# Patient Record
Sex: FEMALE | Race: OTHER
[De-identification: names, ages, dates, MRNs, and addresses within clinical notes are randomized per-mention and may not be internally consistent; named-entity substitution may affect disease eponyms.]

---

## 2019-07-31 ENCOUNTER — HOSPITAL ENCOUNTER (EMERGENCY)
Dept: HOSPITAL 74 - JER | Age: 64
Discharge: HOME | End: 2019-07-31
Payer: COMMERCIAL

## 2019-07-31 VITALS — TEMPERATURE: 97.7 F

## 2019-07-31 VITALS — HEART RATE: 71 BPM | SYSTOLIC BLOOD PRESSURE: 130 MMHG | DIASTOLIC BLOOD PRESSURE: 71 MMHG

## 2019-07-31 VITALS — BODY MASS INDEX: 37.2 KG/M2
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
## 2019-07-31 DIAGNOSIS — R07.9: ICD-10-CM

## 2019-07-31 DIAGNOSIS — I10: ICD-10-CM
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
## 2019-07-31 DIAGNOSIS — E78.5: ICD-10-CM

## 2019-07-31 DIAGNOSIS — M54.30: ICD-10-CM

## 2019-07-31 DIAGNOSIS — R42: Primary | ICD-10-CM

## 2019-07-31 DIAGNOSIS — M54.9: ICD-10-CM

## 2019-07-31 DIAGNOSIS — S46.819A: ICD-10-CM

## 2019-07-31 DIAGNOSIS — R73.03: ICD-10-CM

## 2019-07-31 LAB
ALBUMIN SERPL-MCNC: 3.8 G/DL (ref 3.4–5)
ALP SERPL-CCNC: 75 U/L (ref 45–117)
ALT SERPL-CCNC: 37 U/L (ref 13–61)
ANION GAP SERPL CALC-SCNC: 6 MMOL/L (ref 8–16)
AST SERPL-CCNC: 35 U/L (ref 15–37)
BASOPHILS # BLD: 0.6 % (ref 0–2)
BILIRUB SERPL-MCNC: 0.4 MG/DL (ref 0.2–1)
BUN SERPL-MCNC: 12.3 MG/DL (ref 7–18)
CALCIUM SERPL-MCNC: 9.3 MG/DL (ref 8.5–10.1)
CHLORIDE SERPL-SCNC: 105 MMOL/L (ref 98–107)
CO2 SERPL-SCNC: 28 MMOL/L (ref 21–32)
CREAT SERPL-MCNC: 0.5 MG/DL (ref 0.55–1.3)
DEPRECATED RDW RBC AUTO: 14.3 % (ref 11.6–15.6)
EOSINOPHIL # BLD: 1.6 % (ref 0–4.5)
GLUCOSE SERPL-MCNC: 101 MG/DL (ref 74–106)
HCT VFR BLD CALC: 39.9 % (ref 32.4–45.2)
HGB BLD-MCNC: 13.7 GM/DL (ref 10.7–15.3)
LYMPHOCYTES # BLD: 29.8 % (ref 8–40)
MCH RBC QN AUTO: 31.7 PG (ref 25.7–33.7)
MCHC RBC AUTO-ENTMCNC: 34.3 G/DL (ref 32–36)
MCV RBC: 92.6 FL (ref 80–96)
MONOCYTES # BLD AUTO: 6.6 % (ref 3.8–10.2)
NEUTROPHILS # BLD: 61.4 % (ref 42.8–82.8)
PLATELET # BLD AUTO: 213 K/MM3 (ref 134–434)
PMV BLD: 8.2 FL (ref 7.5–11.1)
POTASSIUM SERPLBLD-SCNC: 4.6 MMOL/L (ref 3.5–5.1)
PROT SERPL-MCNC: 7.4 G/DL (ref 6.4–8.2)
RBC # BLD AUTO: 4.31 M/MM3 (ref 3.6–5.2)
SODIUM SERPL-SCNC: 139 MMOL/L (ref 136–145)
WBC # BLD AUTO: 5.8 K/MM3 (ref 4–10)

## 2019-07-31 PROCEDURE — 3E0337Z INTRODUCTION OF ELECTROLYTIC AND WATER BALANCE SUBSTANCE INTO PERIPHERAL VEIN, PERCUTANEOUS APPROACH: ICD-10-PCS

## 2019-07-31 PROCEDURE — 3E033NZ INTRODUCTION OF ANALGESICS, HYPNOTICS, SEDATIVES INTO PERIPHERAL VEIN, PERCUTANEOUS APPROACH: ICD-10-PCS

## 2019-07-31 NOTE — PDOC
Documentation entered by Floyd Chavez SCRIBE, acting as scribe for Livia Fregoso MD.








Livia Fregoso MD:  This documentation has been prepared by the Scott maxwell Joel, SCRIBE, under my direction and personally reviewed by me in its 

entirety.  I confirm that the documentation accurately reflects all work, 

treatment, procedures, and medical decision making performed by me.  





History of Present Illness





- General


Chief Complaint: Lightheaded


Stated Complaint: DIZZINESS,WEAKNESS


Time Seen by Provider: 19 09:10


History Source: Patient


Exam Limitations: No Limitations





- History of Present Illness


Initial Comments: 





19 10:10


The patient is a 64 year old female with a significant PMH of vertigo, HTN, pre-

diabetes, hyperlipidemia, fibromyalgia, sciatica and arthritis who presents to 

the emergency department for evaluation of acute onset of vertigo, dizziness 

and nausea beginning approximately 5 hours ago. The patient reports waking up  

at about 4am and trying to get off the bed when she experienced room-spinning 

sensation with associated epigastric pain and nausea. She reports taking 

Meclizine to some relief that she had leftover from a 2019 visit for similar 

episode of vertigo. The patient endorses left sided neck pain with radiation to 

the occipital region, dry throat, left ear pain, cough, bilateral eye pain with 

associated blurry vision, and increased urinary frequency since this morning (

but has been drinking water). The patient notes she was told she had vertigo in 

2019, which she was prescribed Meclizine for the symptoms which she takes as 

needed. The patient also notes an episode of palpitations about 3 days ago that 

lasted a few minutes, and also experienced an episode of anterior chest 

pressure shortly after presentation to the ED. 





Denies fever, chills, SOB, weakness, V, D,  bladder and bowel problems, leg 

swelling, No sick contacts or travel. No new changes in medications. No 

suspicious food intake. no environmental triggers.


no stressors.


denies falls or trauma/injuries





Allergies: None


Past Medical History: as documented in EMR/HPI


Social history: Lives with family. No tobacco, ETOH or drug use.


Surgical history: Parathyroidectomy.  


Meds: as documented in EMR


PMD: Dr. Enrique





19 10:49





19 10:52





19 10:52








Past History





- Past Medical History


Allergies/Adverse Reactions: 


 Allergies











Allergy/AdvReac Type Severity Reaction Status Date / Time


 


No Known Allergies Allergy   Verified 19 09:33











Home Medications: 


Ambulatory Orders





Cyclobenzaprine HCl [Flexeril 10 mg] 10 mg PO TID PRN #15 tablet 19 








COPD: No


HTN: Yes


Hypercholesterolemia: Yes


Other medical history: vertigo, fibronyalsia





- Immunization History


Immunization Up to Date: Yes





- Suicide/Smoking/Psychosocial Hx


Smoking History: Never smoked


Hx Alcohol Use: No


Drug/Substance Use Hx: No





**Review of Systems





- Review of Systems


Able to Perform ROS?: Yes


Comments:: 





19 10:15


Constitutional: no fevers or chills.


HEENT: (+) Blurry vision. (+) Bilateral eye pain. +ear pain. no headache. No 

congestion. No hearing disturbances.


CVS:  (+) Anterior chest pressure. + palpitations. no syncope.


Resp: (+) Cough. no sob. 


Gastrointestinal: (+) Epigastric pain. (+) Nausea.  no vomiting. no diarrhea.


Genitourinary: (+) Increased urinary frequency. no hematuria.


MUSCULOSKELETAL: (+) Neck pain with radiation to occipital region. (+) Chronic 

lower back pain from sciatica. +right knee pain. No joint pain and swelling.


SKIN: no redness or skin changes, no discharge, no rash. No wounds.


Hematologic: no easy bruising/bleeding. 


NEUROLOGIC: (+) Dizziness. No headache, LOC or altered mental status. No 

weakness, numbness or tingling. 


Psych: no anxiety or depression


Allergic/Immunologic: no allergies


All other systems reviewed and negative, or as documented in HPI. 








19 10:51








*Physical Exam





- Vital Signs


 Last Vital Signs











Temp Pulse Resp BP Pulse Ox


 


 97.7 F   62   18   148/85   99 


 


 19 09:01  19 09:01  19 09:01  19 09:01  19 09:01














- Physical Exam


Comments: 





19 10:17


General:   Well appearing, awake and alert, NAD. 


HEENT:  NCAT, PERRL, EOMI, No nystagmus. clear conjunctiva, anicteric, moist 

mucus membranes, clear oropharynx, no oral lesions.. 


Neck:  neck supple, FROM


Resp:  CTAB, normal and even respirations, no respiratory distress


CVS: RRR, no murmurs, 2+ peripheral pulses throughout, no peripheral edema


Abdomen: soft, NTND, no peritoneal signs. 


Back: nontender, normal inspection and ROM


MSK:  (+) Upper thoracic muscular tenderness to palpation. (+) Diffuse lower 

back pain tenderness. (+) Left trapezius tenderness. no edema, SCRUGGS x4, ROM 

intact. No clubbing or cyanosis. normal bulk and tone.


Neuro: alert, no focal neuro deficits, oriented to person time and place. No 

carotid bruit, CN II-XII grossly intact. Strength prox and distally 5/5 

throughout. Sensation grossly intact to light touch. SCRUGGS x4. No cerebellar signs

, no dysmetria, bilateral finger to nose and heel to shin equal and symmetric. 

Speech clear. gait stable.  


Psych: calm and cooperative


Skin: warm and well perfused, cap refill <2 sec, normal color


19 10:51








**Heart Score/ECG Review


  ** #1


ECG reviewed & interpreted by me at: 09:40


General ECG Interpretation: Sinus Rhythm, Normal Rate, Normal Intervals


Compared to previous ECG there are: Previous ECG unavail





19 10:07





EKG normal sinus rhythm at 60 bpm, no interval abnormalities, narrow QRS, ST 

and T wave segments and morphology normal. Nonspecific T wave abnormalities 

with TWI in inferior leads III, AVF








ED Treatment Course





- LABORATORY


CBC & Chemistry Diagram: 


 19 09:30





 19 09:30





- RADIOLOGY


Radiology Studies Ordered: 














 Category Date Time Status


 


 CHEST PA & LAT [RAD] Stat Radiology  19 09:33 Ordered














- Medications


Given in the ED: 


ED Medications














Discontinued Medications














Generic Name Dose Route Start Last Admin





  Trade Name Freq  PRN Reason Stop Dose Admin


 


Acetaminophen  975 mg  19 09:36  19 09:54





  Tylenol -  PO  19 09:37  975 mg





  ONCE ONE   Administration





     





     





     





     


 


Lorazepam  1 mg  19 09:35  19 09:49





  Ativan Injection -  IVPUSH  19 09:36  1 mg





  ONCE ONE   Administration





     





     





     





     














Medical Decision Making





- Medical Decision Making





19 10:06





See HPI for details. Prior notes reviewed, including admissions, discharges and 

consultations. 


Vital signs reviewed, wnl. 


Vital Signs











Temp Pulse Resp BP Pulse Ox


 


 97.7 F   62   18   148/85   99 


 


 19 09:01  19 09:01  19 09:01  19 09:01  19 09:01








DDX vertigo, BPPV, labrynthitis, ACS, arrhythmia, angina, anemia, electrolyte/

metabolic derangements. back strain/spasm. msk strain.


no infectious etiology.





laboratory results and imaging reviewed, basic labs and lytes wnl, notable for 

normal LFts


CXR_no acute chest pathology


Cardiac panel_neg x1, reassuring. will need serial trop with chest pressure 

here.


EKG normal sinus rhythm at 60 bpm, no interval abnormalities, narrow QRS, ST 

and T wave segments and morphology normal. Nonspecific T wave abnormalities 

with TWI in inferior leads III, AVF


ED course


-interventions: analgesia, IV ativan for vertigo/muscle spasms


- back pain very reproducible, over left lateral trapezius as well. sciatica 

was induced with msk movements


- neuro intact, no focal deficits, no ataxia, ambulatory, no focal findings.


no imaging indicated at this time, as doubt central etiology of vertigo


- 2 trops/EKG - unchanged, repeat EKG unchanged, nonspecific T wave 

abnormalities, no elevations of ST segments


- on reassessment, symptoms improved, ambulatory. no cp or sob, comfortable 

with discharge and f/u cards/neuro for vertigo/chest pain


- flexeril prn for muscle relaxants.





Pt to be discharged in stable condition. Patient and family made aware of 

clinical impression, treatment recommendations and disposition plan, return 

precautions discussed (including but not limited to new or persistent/worsening 

symptoms, pain, fevers, or signs of infection, chest pain, respiratory distress

, inability to tolerate oral intake, dehydration, syncope, or neurologic changes

). Follow up with PMD and/or specialists as recommended, follow up information 

provided, take medications as instructed for duration of time. continue with 

supportive care, avoid triggers and precipitants. All questions answered to 

patient's satisfaction and expressed understanding and comfort with this. At 

the time of discharge, the patient is alert, clinically improved, tolerating po 

and verbalizes understanding of instructions, satisfied with the care received 

and felt comfortable with the plan. Patient does not suffer from an acute life-

threatening medical condition at this time and  is safe for outpatient follow-

up. 





19 14:43








*DC/Admit/Observation/Transfer


Diagnosis at time of Disposition: 


 Vertigo





Chest pain


Qualifiers:


 Chest pain type: unspecified Qualified Code(s): R07.9 - Chest pain, unspecified





Back pain


Qualifiers:


 Back pain location: back pain in unspecified location Chronicity: unspecified 

Back pain laterality: unspecified Qualified Code(s): M54.9 - Dorsalgia, 

unspecified





Trapezius muscle strain


Qualifiers:


 Encounter type: initial encounter Laterality: unspecified laterality Qualified 

Code(s): S46.819A - Strain of other muscles, fascia and tendons at shoulder and 

upper arm level, unspecified arm, initial encounter








- Discharge Dispostion


Disposition: HOME


Condition at time of disposition: Good


Decision to Admit order: No





- Prescriptions


Prescriptions: 


Cyclobenzaprine HCl [Flexeril 10 mg] 10 mg PO TID PRN #15 tablet


 PRN Reason: Muscle Spasms





- Referrals


Referrals: 


Sunil Enrique MD [Non Staff, Medical] - 


Mikey Butler MD [Staff Physician] - 


Andrey Riddle MD [Staff Physician] - 


Daniele Jones DO [Staff Physician] - 


Orlando Oneal MD [Staff Physician] - 


Michi Sauceda MD [Staff Physician] - 





- Patient Instructions


Printed Discharge Instructions:  DI for Vertigo, DI for Chest Pain, DI for 

Thoracic Back Pain, DI for Low Back Pain, DI for Sciatica


Additional Instructions: 


1) Please follow-up with your primary care doctor in the next 1-2 days. Please 

call tomorrow for for any urgent issues. 


Neurologist referrals given for management of your vertigo/dizziness


cardiologists also given for your chest pain


2) You were given a copy of the tests performed today. Please bring the results 

with you and review them with your primary care doctor. Your laboratory / 

imaging results were normal, including your cardiac enzymes


3) If you have any worsening of symptoms or any other concerns please return to 

the ED immediately. Return if worsening symptoms including fevers, headache, 

vomiting, visual or hearing disturbances, abdominal pain, chest pain, shortness 

of breath, syncope, dehydration, inability to take things by mouth/vomiting, 

altered mental status, or worsening concerning symptoms. 


4) Please continue taking your home medications as directed. your medications 

on discharge include Meclizine (which you have at home) take this three times a 

day as needed for dizziness . side effects may include upset stomach, abdominal 

pain, vomiting, or diarrhea. do not drink alcohol with your medications. this 

can cause you to feel sleepy as well, so avoid driving or operating machinery


Stay well hydrated and rest adequately. Make  an appointment. If you cannot 

follow-up with your primary care doctor please return to the ED 


------------------------------------------------------


1) Mann un seguimiento con [facility] atencin primaria en los prximos 1-2 

[place]. Llame maana para cualquier problema urgente.


Referencias de neurlogos para el manejo de vrtigo / mareos.


cardilogos tambin administrados para el dolor en el pecho


2) Le dieron lyla copia de las pruebas realizadas hoy. Traiga los resultados con 

usted y revselos con [facility]encin prim[facility]. Los resultados de [facility] 

laboratorio / imagen fueron normales, incluidas bernard enzimas cardacas.


3) Si tiene algn empeoramiento de los sntomas o cualquier otra inquietud, 

regrese al servicio de urgencias de inmediato. Regrese si empeora los sntomas, 

reed fiebre, dolor de domingo, vmitos, trastornos visuales o auditivos, dolor 

abdominal, dolor en el pecho, falta de aliento, sncope, deshidratacin, 

incapacidad para cindy cosas por la boca / vmitos, estado mental alterado o 

empeoramiento de los sntomas.


4) Contine tomando bernard medicamentos caseros segn las indicaciones. bernard 

medicamentos al michaela incluyen Meclizine (que tiene en casa) tome esto bobbi 

veces al da segn sea necesario para mareos. Los efectos secundarios pueden 

incluir malestar estomacal, dolor abdominal, vmitos o diarrea. No tome alcohol 

con bernard medicamentos. esto tambin puede causarle sueo, as que evite conducir 

u operar maquinaria


Mantngase cas hidratado y descanse adecuadamente. Mann lyla fareed Si no puede 

hacer un seguimiento con [facility] atencin prim[facility], regrese al servicio de 

urgencias





Print Language: Italian





- Post Discharge Activity

## 2019-07-31 NOTE — EKG
Test Reason : 

Blood Pressure : ***/*** mmHG

Vent. Rate : 060 BPM     Atrial Rate : 060 BPM

   P-R Int : 162 ms          QRS Dur : 088 ms

    QT Int : 426 ms       P-R-T Axes : 021 086 -11 degrees

   QTc Int : 426 ms

 

NORMAL SINUS RHYTHM

POSSIBLE LEFT ATRIAL ENLARGEMENT

ABNORMAL QRS-T ANGLE, CONSIDER PRIMARY T WAVE ABNORMALITY

ABNORMAL ECG

NO PREVIOUS ECGS AVAILABLE

Confirmed by LAI SAAB MD ([id number]) on 7/31/2019 2:25:15 PM

 

Referred By:             Confirmed By:LAI SAAB MD

## 2019-07-31 NOTE — EKG
Test Reason : 

Blood Pressure : ***/*** mmHG

Vent. Rate : 068 BPM     Atrial Rate : 068 BPM

   P-R Int : 162 ms          QRS Dur : 090 ms

    QT Int : 406 ms       P-R-T Axes : 044 -38 093 degrees

   QTc Int : 431 ms

 

NORMAL SINUS RHYTHM

LEFT AXIS DEVIATION

NONSPECIFIC T WAVE ABNORMALITY

ABNORMAL ECG

WHEN COMPARED WITH ECG OF 31-JUL-2019 09:39,

QRS AXIS SHIFTED LEFT

NON-SPECIFIC CHANGE IN ST SEGMENT IN INFERIOR LEADS

T WAVE INVERSION NO LONGER EVIDENT IN INFERIOR LEADS

NONSPECIFIC T WAVE ABNORMALITY, WORSE IN LATERAL LEADS

Confirmed by KAISER LACKEY, LAI ([id number]) on 7/31/2019 2:30:48 PM

 

Referred By:             Confirmed By:LAI SAAB MD

## 2019-08-01 NOTE — EKG
Test Reason : 

Blood Pressure : ***/*** mmHG

Vent. Rate : 067 BPM     Atrial Rate : 067 BPM

   P-R Int : 164 ms          QRS Dur : 090 ms

    QT Int : 372 ms       P-R-T Axes : 040 -36 108 degrees

   QTc Int : 393 ms

 

NORMAL SINUS RHYTHM

LEFT AXIS DEVIATION

SEPTAL INFARCT , AGE UNDETERMINED

ABNORMAL ECG

WHEN COMPARED WITH ECG OF 31-JUL-2019 09:39,

QRS AXIS SHIFTED LEFT

ST NOW DEPRESSED IN INFERIOR LEADS

NONSPECIFIC T WAVE ABNORMALITY, WORSE IN LATERAL LEADS

Confirmed by DARRIAN SILVERMAN MD ([id number]) on 8/1/2019 2:06:01 PM

 

Referred By:             Confirmed By:DARRIAN SILVERMAN MD

## 2019-09-24 ENCOUNTER — HOSPITAL ENCOUNTER (INPATIENT)
Dept: HOSPITAL 74 - JER | Age: 64
LOS: 1 days | Discharge: HOME | DRG: 203 | End: 2019-09-25
Attending: NURSE PRACTITIONER | Admitting: GENERAL ACUTE CARE HOSPITAL
Payer: COMMERCIAL

## 2019-09-24 VITALS — BODY MASS INDEX: 37.5 KG/M2

## 2019-09-24 DIAGNOSIS — K21.9: ICD-10-CM

## 2019-09-24 DIAGNOSIS — R42: ICD-10-CM

## 2019-09-24 DIAGNOSIS — R07.89: Primary | ICD-10-CM

## 2019-09-24 DIAGNOSIS — F32.9: ICD-10-CM

## 2019-09-24 DIAGNOSIS — E78.5: ICD-10-CM

## 2019-09-24 DIAGNOSIS — N20.0: ICD-10-CM

## 2019-09-24 DIAGNOSIS — R73.03: ICD-10-CM

## 2019-09-24 DIAGNOSIS — M79.7: ICD-10-CM

## 2019-09-24 DIAGNOSIS — R10.9: ICD-10-CM

## 2019-09-24 DIAGNOSIS — I10: ICD-10-CM

## 2019-09-24 LAB
ALBUMIN SERPL-MCNC: 4.1 G/DL (ref 3.4–5)
ALP SERPL-CCNC: 81 U/L (ref 45–117)
ALT SERPL-CCNC: 43 U/L (ref 13–61)
ANION GAP SERPL CALC-SCNC: 6 MMOL/L (ref 8–16)
AST SERPL-CCNC: 38 U/L (ref 15–37)
BASOPHILS # BLD: 0.9 % (ref 0–2)
BILIRUB SERPL-MCNC: 0.3 MG/DL (ref 0.2–1)
BUN SERPL-MCNC: 8.3 MG/DL (ref 7–18)
CALCIUM SERPL-MCNC: 9.7 MG/DL (ref 8.5–10.1)
CHLORIDE SERPL-SCNC: 103 MMOL/L (ref 98–107)
CO2 SERPL-SCNC: 31 MMOL/L (ref 21–32)
CREAT SERPL-MCNC: 0.5 MG/DL (ref 0.55–1.3)
DEPRECATED RDW RBC AUTO: 13.8 % (ref 11.6–15.6)
EOSINOPHIL # BLD: 2.7 % (ref 0–4.5)
GLUCOSE SERPL-MCNC: 85 MG/DL (ref 74–106)
HCT VFR BLD CALC: 39.6 % (ref 32.4–45.2)
HGB BLD-MCNC: 13.5 GM/DL (ref 10.7–15.3)
LIPASE SERPL-CCNC: 372 U/L (ref 73–393)
LYMPHOCYTES # BLD: 40.5 % (ref 8–40)
MCH RBC QN AUTO: 31.3 PG (ref 25.7–33.7)
MCHC RBC AUTO-ENTMCNC: 34.1 G/DL (ref 32–36)
MCV RBC: 91.9 FL (ref 80–96)
MONOCYTES # BLD AUTO: 8.3 % (ref 3.8–10.2)
NEUTROPHILS # BLD: 47.6 % (ref 42.8–82.8)
PH UR: 6.5 [PH] (ref 5–8)
PLATELET # BLD AUTO: 225 K/MM3 (ref 134–434)
PMV BLD: 8 FL (ref 7.5–11.1)
POTASSIUM SERPLBLD-SCNC: 3.8 MMOL/L (ref 3.5–5.1)
PROT SERPL-MCNC: 7.5 G/DL (ref 6.4–8.2)
RBC # BLD AUTO: 4.31 M/MM3 (ref 3.6–5.2)
SODIUM SERPL-SCNC: 139 MMOL/L (ref 136–145)
SP GR UR: 1.01 (ref 1.01–1.03)
UROBILINOGEN UR STRIP-MCNC: 0.2 MG/DL (ref 0.2–1)
WBC # BLD AUTO: 6.4 K/MM3 (ref 4–10)

## 2019-09-24 PROCEDURE — A9502 TC99M TETROFOSMIN: HCPCS

## 2019-09-24 RX ADMIN — POLYVINYL ALCOHOL SCH: 14 SOLUTION/ DROPS OPHTHALMIC at 19:00

## 2019-09-24 RX ADMIN — POLYVINYL ALCOHOL SCH DROP: 14 SOLUTION/ DROPS OPHTHALMIC at 22:36

## 2019-09-24 NOTE — PDOC
Attending Attestation





- Resident


Resident Name: Yogesh Rodas





- ED Attending Attestation


I have performed the following: I have examined & evaluated the patient, The 

case was reviewed & discussed with the resident, I agree w/resident's findings 

& plan





- HPI


HPI: 





09/24/19 15:53


64 year old female with PMH significant for vertigo, HTN, HLD, fibromyalgia, 

and pre-DM. She was getting an echo done at her cardiologist's office (Dr. Bui) when she started developing chest pain, and was referred to the 

ER. could  not finish her stress test today with her chest pain.


intermittent left sided chest and arm pain x 4 months. can last 2-4 hours, 

gradual, dull and reproducible





a/w sharp shooting pains in her left hand x 9 months.





Her PCP told her that this was a symptom of her Fibromyalgia, and she received 

and injection in her left elbow 4 months ago, which she claims did not help. 

Around 5 months ago, the pain began to radiate past her left elbow, all the way 

to her chest. The pain is associated with some mild SOB and a headache.





She also complains of intermittent RUQ and RLQ abdominal pain with lower back 

pain for the past 3 months. She has associated brown discoloration of the urine

, but no associated nausea, vomiting, diarrhea, dysuria, or hematuria.





She has been taking Paroxetine since 1997, which she discontinued 2 months ago. 

She has had no other changes to her medication, and denies any recent illnesses

, exposure to sick contacts, or recent travel. 








09/24/19 15:53





09/24/19 16:00








- Physicial Exam


PE: 





09/24/19 15:53


Agree with the resident's HPI and PE as documented in the electronic medical 

record.


NAD, well appearing, EOMI, PERRL, MMM, nl conjunctiva, anicteric; neck supple. 

lungs clear, RRR, abdomen soft nontender. Back nontender. SCRUGGS x4, no focal 

neuro deficits. No peripheral edema. normal color for ethnicity, WWP.








- Medical Decision Making





09/24/19 15:54


See HPI for details. Prior notes reviewed, including admissions, discharges and 

consultations. 


Vital signs reviewed, wnl. 


Vital Signs











Temp Pulse Resp BP Pulse Ox


 


 98.2 F   74   17   139/81   96 


 


 09/24/19 11:49  09/24/19 11:49  09/24/19 11:49  09/24/19 11:49  09/24/19 11:49








DDx chest pain: ACS, coronary vasospasm, NSTEMI, arrhythmia, unstable angina, PE

, dissection, PUD, esophageal spasm, GERD, gastritis, costochondritis, pneumonia

, pleurisy, pericarditis/myocarditis. dehydration, electrolyte/metabolic 

derangements. 





laboratory results and imaging reviewed, basic labs and lytes wnl, 


LFTs/lipase_wnl


UA_unremarkable.


CXR_no acute chest pathology. clear.


Cardiac panel_neg


EKG normal sinus rhythm at 69 bpm, no interval abnormalities, narrow QRS, ST 

and T wave segments and morphology normal. Nonspecific T wave abnormalities 





ED course: no events.


GB sono neg for stones or anton. 


admit to complete stress test, cards cs Dr Bui, medical management, 

serial trops.





09/24/19 15:58








**Heart Score/ECG Review


  ** #1


ECG reviewed & interpreted by me at: 11:40


General ECG Interpretation: Sinus Rhythm, Normal Rate, Normal Intervals


Compared to previous ECG there are: No significant change





09/24/19 15:59


EKG normal sinus rhythm at 69 bpm, no interval abnormalities, narrow QRS, ST 

and T wave segments and morphology normal. Nonspecific T wave abnormalities

## 2019-09-24 NOTE — HP
Admitting History and Physical





- Primary Care Physician


PCP: Koby Enrique





- Admission


Chief Complaint: chest pressure associated with epigastric pain radiating to 

right flank


History of Present Illness: 





64 year old female with PMH significant for vertigo, HTN, HLD, fibromyalgia, 

and pre-DM. She was getting an echo done at her cardiologist's office (Dr. Bui) when she started developing chest pain, and was referred to the 

ER. 





She states that she has had intermittent chest pain for the past 4 months, 

mostly on the left side of her chest, with episodes lasting 2-4 hours, gradual 

in onset, and dull in nature, reproducible on palpation and non-pleuritic. The 

chest pain is associated with left hand shooting pain that she has had for the 

past 9 months. The pain initially began in the 2nd and 3rd fingers of the left 

hand, radiating up to the elbow, exacerbated by movement of the arm. Her PCP 

told her that this was a symptom of her Fibromyalgia, and she received and 

injection in her left elbow 4 months ago, which she claims did not help. Around 

5 months ago, the pain began to radiate past her left elbow, all the way to her 

chest. The pain is associated with some mild SOB and a headache.





She also complains of intermittent RUQ and RLQ abdominal pain with lower back 

pain for the past 3 months. She has associated brown discoloration of the urine

, but no associated nausea, vomiting, diarrhea, dysuria, or hematuria.





She has been taking Paroxetine since 1997, which she discontinued 2 months ago. 

She has had no other changes to her medication, and denies any recent illnesses

, exposure to sick contacts, or recent travel. 


History Source: Patient


Limitations to Obtaining History: No Limitations





- Past Medical History


Cardiovascular: Yes: HTN, Hyperlipdemia


Pulmonary: No: Asthma, Bronchitis, Cancer, COPD, O2 Dependent, Pneumonia, 

Previously Intubated, Pulmonary Embolus, Pulmonary Fibrosis, Sleep Apnea, Other


Gastrointestinal: Yes: GERD


Hepatobiliary: No: Cirrhosis, Cholelithiasis, Cholecystitis, Choledocholithiasis

, Hepatitis A, Hepatitis B, Hepatitis C, Other


Renal/: No: Renal Failure, Renal Inusuff, BPH, Cancer, Hematuria, Hemodialysis

, Neurogenic Bladder, Renal Calculi, UTI, Other


Psych: Yes: Depression





- Smoking History


Smoking history: Unknown if ever smoked





- Alcohol/Substance Use


Hx Alcohol Use: No





- Social History


Usual Living Arrangement: Yes: With Child (lives with children)


History of Recent Travel: No





Home Medications





- Allergies


Allergies/Adverse Reactions: 


 Allergies











Allergy/AdvReac Type Severity Reaction Status Date / Time


 


No Known Allergies Allergy   Verified 09/24/19 11:43














- Home Medications


Home Medications: 


Ambulatory Orders





Amlodipine Besylate [Norvasc -] 5 mg PO DAILY 09/24/19 


Duloxetine HCl [Cymbalta] 30 mg PO DAILY 09/24/19 


Nortriptyline HCl [Pamelor -] 10 mg PO HS 09/24/19 


Paroxetine HCl [Paxil] 20 mg PO DAILY 09/24/19 


Polyvinyl Alcohol [Artificial Tears] 1 drop OU ASDIR 09/24/19 


Ranitidine [Zantac -] 300 mg PO HS 09/24/19 


Simvastatin 40 mg PO HS 09/24/19 











Family Medical History


Family History: Denies





Review of Systems





- Review of Systems


Constitutional: reports: No Symptoms


Eyes: reports: No Symptoms


HENT: reports: No Symptoms


Neck: reports: No Symptoms


Cardiovascular: reports: Chest Pain, Shortness of Breath (on exertion)


Respiratory: reports: Exercise Intolerance, SOB on Exertion


Gastrointestinal: reports: Abdominal Pain


Genitourinary: reports: No Symptoms


Musculoskeletal: reports: No Symptoms


Integumentary: reports: No Symptoms


Neurological: reports: No Symptoms


Endocrine: reports: No Symptoms


Hematology/Lymphatic: reports: No Symptoms


Psychiatric: reports: No Symptoms





Physical Examination


Vital Signs: 


 Vital Signs











Temperature  98.2 F   09/24/19 11:49


 


Pulse Rate  74   09/24/19 11:49


 


Respiratory Rate  17   09/24/19 11:49


 


Blood Pressure  139/81   09/24/19 11:49


 


O2 Sat by Pulse Oximetry (%)  96   09/24/19 11:49











Constitutional: Yes: Well Nourished, No Distress, Calm


Eyes: Yes: WNL, Conjunctiva Clear, EOM Intact


HENT: Yes: WNL, Atraumatic, Normocephalic


Neck: Yes: WNL, Supple, Trachea Midline


Cardiovascular: Yes: WNL, Regular Rate and Rhythm


Respiratory: Yes: WNL, Regular, CTA Bilaterally


Gastrointestinal: Yes: Normal Bowel Sounds, Soft, Tenderness (epigastric area)


...Rectal Exam: Yes: Deferred


Breast(s): Yes: WNL


Musculoskeletal: Yes: WNL


Extremities: Yes: WNL


Edema: Yes


Peripheral Pulses WNL: Yes


Integumentary: Yes: WNL


Neurological: Yes: WNL, Alert, Oriented


...Motor Strength: WNL


Psychiatric: Yes: WNL


Labs: 


 CBC, BMP





 09/24/19 13:30 





 09/24/19 13:20 











Imaging





- Results


Ultrasound: Report Reviewed (Ultrasound RUQ: Borderline hepatomegaly w fatty 

infiltration vs hepatocellular disease, 1.4cm nonobs stone in R mid kidney, no 

hydronephrosis)


EKG: Report Reviewed (EKG normal sinus rhythm at 69 bpm, no interval 

abnormalities, narrow QRS, ST and T wave segments and morphology normal. 

Nonspecific T wave abnormalities)





Problem List





- Problems


(1) Prophylactic measure


Assessment/Plan: 


FEN


low fat/chol diet


NPO past midnight


monitor electrolytes





DVT


ambulatory





Dispo


mainatin on tele until after stress test tmrw


full code


discharge planning


Code(s): Z29.9 - ENCOUNTER FOR PROPHYLACTIC MEASURES, UNSPECIFIED   





(2) HLD (hyperlipidemia)


Assessment/Plan: 


c/w atrovastain





Code(s): E78.5 - HYPERLIPIDEMIA, UNSPECIFIED   





(3) HTN (hypertension)


Assessment/Plan: 


normotensive


c/w norvasc


Code(s): I10 - ESSENTIAL (PRIMARY) HYPERTENSION   





(4) Depression


Assessment/Plan: 


c/w cymbalta/paxil


emotional support 


Code(s): F32.9 - MAJOR DEPRESSIVE DISORDER, SINGLE EPISODE, UNSPECIFIED   





(5) Atypical chest pain


Assessment/Plan: 


appreciate cardiology consultation-Dr Bui


stress test for tmrw


Trop .02 


c/w serial troponins


 


Code(s): R07.89 - OTHER CHEST PAIN   





(6) Chest pain


Code(s): R07.9 - CHEST PAIN, UNSPECIFIED   


Qualifiers: 


   Chest pain type: unspecified   Qualified Code(s): R07.9 - Chest pain, 

unspecified   





(7) Vertigo


Assessment/Plan: 


not active


Code(s): R42 - DIZZINESS AND GIDDINESS   





(8) Fibromyalgia


Code(s): M79.7 - FIBROMYALGIA   





Visit type





- Emergency Visit


Emergency Visit: Yes


ED Registration Date: 09/24/19


Care time: The patient presented to the Emergency Department on the above date 

and was hospitalized for further evaluation of their emergent condition.





- New Patient


This patient is new to me today: Yes


Date on this admission: 09/24/19





- Critical Care


Critical Care patient: No

## 2019-09-24 NOTE — CON.CARD
Consult


Consult Specialty:: Cardiology


Referred by:: ADAM LACKEY


Reason for Consultation:: HTN and CP





- History of Present Illness


Chief Complaint: Sent from office for elevated BP prior to stress


History of Present Illness: 





64F depression, HTN, HLD with > 6 months atypical chest pain recently seen in 

office referred for outpt stress.


At stress exam today, BP 160s/110 sent to ER by NP for uncontrolled BP. Stress 

not done.





In ER, BP essentially WNL/borderline elevated.


She states her pain is central, random, not exertional. No jaw sx, no N/V or 

diaphoresis.


Chronic GAYTAN.





Has chronic MSK arm pain, worse with movement of shoulder and this movement 

also causes some radiation to chest. 





- History Source


History Provided By: Patient


Limitations to Obtaining History: No Limitations





- Past Medical History


Cardio/Vascular: Yes: HTN, Hyperlipdemia


Pulmonary: No: Asthma, Bronchitis, Cancer, COPD, O2 Dependent, Pneumonia, 

Previously Intubated, Pulmonary Embolus, Pulmonary Fibrosis, Sleep Apnea, Other


Gastrointestinal: Yes: GERD


Hepatobiliary: No: Cirrhosis, Cholelithiasis, Cholecystitis, Choledocholithiasis

, Hepatitis A, Hepatitis B, Hepatitis C, Other


Renal/: No: Renal Failure, Renal Inusuff, BPH, Cancer, Hematuria, Hemodialysis

, Neurogenic Bladder, Renal Calculi, UTI, Other


Psych: Yes: Depression





- Alcohol/Substance Use


Hx Alcohol Use: No





- Smoking History


Smoking history: Unknown if ever smoked





- Social History


History of Recent Travel: No





Home Medications





- Allergies


Allergies/Adverse Reactions: 


 Allergies











Allergy/AdvReac Type Severity Reaction Status Date / Time


 


No Known Allergies Allergy   Verified 09/24/19 11:43














- Home Medications


Home Medications: 


Ambulatory Orders





Amlodipine Besylate [Norvasc -] 5 mg PO DAILY 09/24/19 


Duloxetine HCl [Cymbalta] 30 mg PO DAILY 09/24/19 


Nortriptyline HCl [Pamelor -] 10 mg PO HS 09/24/19 


Paroxetine HCl [Paxil] 20 mg PO DAILY 09/24/19 


Polyvinyl Alcohol [Artificial Tears] 1 drop OU ASDIR 09/24/19 


Ranitidine [Zantac -] 300 mg PO HS 09/24/19 


Simvastatin 40 mg PO HS 09/24/19 











Family Medical History


Family History: Unremarkable (no early CAD or SCD)





Review of Systems





- Review of Systems


Constitutional: reports: No Symptoms


Eyes: reports: No Symptoms


HENT: reports: No Symptoms


Cardiovascular: reports: Chest Pain, Shortness of Breath


Respiratory: reports: SOB on Exertion


Gastrointestinal: denies: No Symptoms, Abdominal Pain, Bloating, Constipation, 

Diarrhea, Dysphagia, Indigestion, Melena, Nausea, Rectal Bleeding, Vomiting, 

Vomiting Blood, Other


Genitourinary: denies: No Symptoms, Burning, Discharge, Dysuria, Flank Pain, 

Frequency, Hematuria, Incontinence, Lesions, Menses, Pain, Testicular Mass, 

Testicular Pain, Testicular Swelling, Urgency, Vaginal Bleeding, Other


Breasts: denies: No Symptoms Reported, See HPI, Breast Implants, Discharge from 

Nipple, Lumps, Pain, Skin Changes, Other


Musculoskeletal: reports: Joint Pain, Other (left arm pain, shoulder pain w/ 

movement and change of position)


Neurological: denies: No Symptoms, Change in LOC, Change in Speech, Confusion, 

Dizziness, Headache, Incoordination, Numbness, Parasthesia, Pre-Existing Deficit

, Seizure, Syncope, Tremors, Unsteady Gait, Weakness, Other


Endocrine: reports: No Symptoms


Hematology/Lymphatic: reports: No Symptoms


Psychiatric: reports: No Symptoms





- Risk Factors


Known Risk Factors: Yes: Hypercholesterolemia, Hypertension


Vital Signs: 


 Vital Signs











Temperature  98.2 F   09/24/19 11:49


 


Pulse Rate  74   09/24/19 11:49


 


Respiratory Rate  17   09/24/19 11:49


 


Blood Pressure  139/81   09/24/19 11:49


 


O2 Sat by Pulse Oximetry (%)  96   09/24/19 11:49











Constitutional: Yes: No Distress, Calm


Eyes: Yes: Conjunctiva Clear, EOM Intact


HENT: Yes: Atraumatic, Normocephalic


Neck: Yes: Trachea Midline


Respiratory: Yes: CTA Bilaterally


Gastrointestinal: Yes: Soft, Abdomen, Obese


Cardiovascular: Yes: Regular Rate and Rhythm


JVD: No


PMI: Non-Displaced


Heart Sounds: Yes: S1, S2 (rrr, no m/r/g)


Edema: No


Peripheral Pulses WNL: Yes


Neurological: Yes: Alert, Oriented


...Motor Strength: WNL


Psychiatric: Yes: WNL





- Other Data


Labs, Other Data: 


 CBC, BMP





 09/24/19 13:30 





 09/24/19 13:20 





 Troponin, BNP











  09/24/19





  13:30


 


Troponin I  < 0.02








 Troponin, BNP











  09/24/19





  13:30


 


Troponin I  < 0.02








 Laboratory Tests











  09/24/19 09/24/19 09/24/19





  13:20 13:30 13:30


 


WBC   6.4 


 


Hgb   13.5 


 


Plt Count   225 


 


Sodium  139  


 


Potassium  3.8  


 


Creatinine  0.5 L  


 


Creatine Kinase    104


 


Troponin I    < 0.02














Imaging





- Results


Chest X-ray: Image Reviewed (no acute path)


Ultrasound: Report Reviewed (renal stone, non-obstx)


EKG: Image Reviewed (NSR, LAE, NSST)





Assessment/Plan





IMP:


HTN, chronic


Chest pain syndrome, chronic: suspect MSK


HLD


GERD


Renal stone, non-obstx/ incidental








REC:


1. Echo 


2. Exercise MPI in AM


3. Usual home BP meds


4. Outpatient Urology eval for renal stone.

## 2019-09-24 NOTE — PDOC
History of Present Illness





- History of Present Illness


Initial Comments: 


09/24/19 13:18





64 year old female with PMH significant for vertigo, HTN, HLD, fibromyalgia, 

and pre-DM. She was getting an echo done at her cardiologist's office (Dr. Bui) when she started developing chest pain, and was referred to the 

ER. 





She states that she has had intermittent chest pain for the past 4 months, 

mostly on the left side of her chest, with episodes lasting 2-4 hours, gradual 

in onset, and dull in nature, reproducible on palpation and non-pleuritic. The 

chest pain is associated with left hand shooting pain that she has had for the 

past 9 months. The pain initially began in the 2nd and 3rd fingers of the left 

hand, radiating up to the elbow, exacerbated by movement of the arm. Her PCP 

told her that this was a symptom of her Fibromyalgia, and she received and 

injection in her left elbow 4 months ago, which she claims did not help. Around 

5 months ago, the pain began to radiate past her left elbow, all the way to her 

chest. The pain is associated with some mild SOB and a headache.





She also complains of intermittent RUQ and RLQ abdominal pain with lower back 

pain for the past 3 months. She has associated brown discoloration of the urine

, but no associated nausea, vomiting, diarrhea, dysuria, or hematuria.





She has been taking Paroxetine since 1997, which she discontinued 2 months ago. 

She has had no other changes to her medication, and denies any recent illnesses

, exposure to sick contacts, or recent travel. 











<Yogesh Rodas - Last Filed: 09/24/19 16:27>





<Livia Fregoso - Last Filed: 09/26/19 09:45>





- General


Stated Complaint: SENT BY /CHEST PAIN


Time Seen by Provider: 09/24/19 12:09





Past History





- Past Medical History


COPD: No


HTN: Yes


Hypercholesterolemia: Yes





- Immunization History


Immunization Up to Date: Yes





- Psycho Social/Smoking Cessation Hx


Smoking History: Unknown if ever smoked


Hx Alcohol Use: No


Drug/Substance Use Hx: No





<Yogesh Rodas - Last Filed: 09/24/19 16:27>





<Livia Fregoso - Last Filed: 09/26/19 09:45>





- Past Medical History


Allergies/Adverse Reactions: 


 Allergies











Allergy/AdvReac Type Severity Reaction Status Date / Time


 


No Known Allergies Allergy   Verified 09/24/19 11:43











Home Medications: 


Ambulatory Orders





Amlodipine Besylate [Norvasc -] 5 mg PO DAILY 09/24/19 


Duloxetine HCl [Cymbalta] 30 mg PO DAILY 09/24/19 


Nortriptyline HCl [Pamelor -] 10 mg PO HS 09/24/19 


Paroxetine HCl [Paxil] 20 mg PO DAILY 09/24/19 


Polyvinyl Alcohol [Artificial Tears] 1 drop OU ASDIR 09/24/19 


Ranitidine [Zantac -] 300 mg PO HS 09/24/19 


Simvastatin 40 mg PO HS 09/24/19 


Aspirin Coated [Ecotrin -] 81 mg PO DAILY  [email or web address] 09/25/19 











**Review of Systems





- Review of Systems


Constitutional: No: Symptoms Reported, See HPI, Chills, Diaphoresis, Fever, 

Loss of Appetite, Malaise, Night Sweats, Weakness, Weight Stable, Unintentional 

Wgt. Loss, Unexplained wgt Loss, Other


HEENTM: No: Symptoms Reported, See HPI, Eye Pain, Blurred Vision, Tearing, 

Recent change in vision, Double Vision, Cataracts, Ear Pain, Ocular Prothesis, 

Ear Discharge, Nose Pain, Nose Congestion, Tinnitus, Nose Bleeding, Hearing Loss

, Throat Pain, Throat Swelling, Mouth Pain, Dental Problems, Difficulty 

Swallowing, Mouth Swelling, Other


Respiratory: Yes: Shortness of Breath


Cardiac (ROS): Yes: Chest Pain, Lightheadedness


ABD/GI: Yes: Abdominal cramping


: No: Symptoms Reported, See HPI, Burning, Dysuria, Discharge, Frequency, 

Flank Pain, Hematuria, Incontinence, Pain, Urgency, Testicular Mass, Testicular 

Swelling, Lesions, Testicular Pain, Other


Musculoskeletal: Yes: See HPI


Integumentary: No: Symptoms Reported, See HPI, Bruising, Change in Color, 

Change in Hair/Nails, Dryness, Erythema, Flushing, Lesions, Lumps, Pallor, 

Pruritus, Rash, Sweating, Other


Neurological: Yes: Weakness


Psychiatric: No: Anxiety, Depression, Frequent Crying, Stressors, Sleep Pattern 

Change, Emotional Problems, Mood Swings, Change in Appetite, Other


Endocrine: No: Symptoms Reported, See HPI, Excessive Sweating, Flushing, 

Intolerance to Cold, Intolerance to Heat, Increased Hunger, Increased Thirst, 

Increased Urine, Unexplained Weight Gain, Unexplained Weight Loss, Change in 

Weight, Other


Hematologic/Lymphatic: No: Symptoms Reported, See HPI, Anemia, Blood Clots, 

Easy Bleeding, Easy Bruising, Bleeding Diathesis, Lymph Node Abnormalities, 

Swollen Glands, Other





<Yogesh Rodas - Last Filed: 09/24/19 16:27>





*Physical Exam





- Vital Signs


 Last Vital Signs











Temp Pulse Resp BP Pulse Ox


 


 98.2 F   74   17   139/81   96 


 


 09/24/19 11:49  09/24/19 11:49  09/24/19 11:49  09/24/19 11:49  09/24/19 11:49














- Physical Exam


Comments: 


09/24/19 13:43





General: AOx3, cooperative


Eyes: OLGA, EOM intact


Nares: patent, no DC


Ears: patent, no DC


CVS: RRR, S1 S2 appreciated


Lungs: B/L clear


Abdomen: SOft, tenderness in epigastrium, RUQ, RLQ, suprapubic, Psoas Rovsing 

and Bruner's negative


Extremities: No pitting edema


Neuro: Motor 4/5 in left arm (due to pain), 5/5 everywhere else


   Sensations intact B/L


   CN II-XII intact


   Cerebellar: No nystagmus, finger to nose, heel to shin, Romberg, rapid 

alternating movements all negative














<Yogesh Rodas - Last Filed: 09/24/19 16:27>





- Vital Signs


 Last Vital Signs











Temp Pulse Resp BP Pulse Ox


 


 98.1 F   84   18   140/104 H  96 


 


 09/25/19 14:00  09/25/19 14:00  09/25/19 09:00  09/25/19 14:00  09/25/19 09:00














<Livia Fregoso - Last Filed: 09/26/19 09:45>





**Heart Score/ECG Review





- Electrocardiogram


EKG: Normal





- Age


Age: 45-65





- Risk Factors


Risk Factors Heart Score: Yes Hx Hypertension, Yes Positive family hx of 

cardiac disease, Yes Hx Obesity


Based on the list above the patient has:: >/=3 risk factors or Hx 

atherosclerotic disease





- Troponin


Troponin: </= normal limit





- ECG Intrepretation


Rhythm: Regular Rhythm





- Axis


Axis: Normal





- ST and T


Early Repolarization: No


Non Specific ST-T Wave changes: No





- ECG Impressions


Normal ECG: Yes


Comment:: 


09/24/19 16:27





EKG normal sinus rhythm at 69 bpm, no interval abnormalities, narrow QRS, ST 

and T wave segments and morphology normal. Nonspecific T wave abnormalities





<Yogesh Rodas - Last Filed: 09/24/19 16:27>





ED Treatment Course





- LABORATORY


CBC & Chemistry Diagram: 


 09/24/19 13:30





 09/24/19 13:20





<Yogesh Rodas - Last Filed: 09/24/19 16:27>





- LABORATORY


CBC & Chemistry Diagram: 


 09/25/19 06:15





 09/25/19 06:15





- ADDITIONAL ORDERS


Additional order review: 














 09/24/19 15:20 Urine Culture - Final





 Urine - Urine Clean Catch    Streptococcus Viridans








 











  09/24/19





  13:30


 


RBC  4.31


 


MCV  91.9


 


MCHC  34.1


 


RDW  13.8


 


MPV  8.0


 


Neutrophils %  47.6  D


 


Lymphocytes %  40.5 H D


 


Monocytes %  8.3


 


Eosinophils %  2.7


 


Basophils %  0.9














- Medications


Given in the ED: 


ED Medications














Discontinued Medications














Generic Name Dose Route Start Last Admin





  Trade Name Freq  PRN Reason Stop Dose Admin


 


Amlodipine Besylate  5 mg  09/25/19 10:00  09/25/19 09:26





  Norvasc -  PO   5 mg





  DAILY BROOKLYN   Administration





     





     





     





     


 


Artificial Tears  1 drop  09/24/19 16:30  09/24/19 18:49





  Artificial Tears  OU   Not Given





  ASDIR BROOKLYN   





     





     





     





     


 


Artificial Tears  1 drop  09/24/19 18:00  09/25/19 14:57





  Artificial Tears  OU   1 drop





  QID BROOK[facility]N   Administration





     





     





     





     


 


Aspirin  81 mg  09/25/19 10:00  09/25/19 13:29





  Ecotrin -  PO   81 mg





  DAILY [facility]N   Administration





     





     





     





     


 


Aspirin  81 mg  09/24/19 15:59  09/24/19 16:00





  Asa -  PO  09/24/19 16:00  81 mg





  ONCE ONE   Administration





     





     





     





     


 


Atorvastatin Calcium  40 mg  09/24/19 22:00  09/24/19 22:36





  Lipitor -  PO   40 mg





  HS [facility]N   Administration





     





     





     





     


 


Duloxetine HCl  30 mg  09/25/19 10:00  09/25/19 13:29





  Cymbalta -  PO   30 mg





  DAILY [facility]   Administration





     





     





     





     


 


Nortriptyline HCl  10 mg  09/24/19 22:00  09/24/19 22:36





  Pamelor -  PO   10 mg





  HS [facility]   Administration





     





     





     





     


 


Paroxetine HCl  20 mg  09/25/19 10:00  09/25/19 13:29





  Paxil -  PO   20 mg





  DAILY [facility]N   Administration





     





     





     





     


 


Pneumococcal Polyvalent Vaccine  0.5 ml  09/25/19 10:00  09/25/19 13:34





  Pneumovax -  IM  09/25/19 10:01  Not Given





  .ONCE ONE   





     





     





     





     


 


Ranitidine HCl  300 mg  09/24/19 22:00  09/24/19 22:36





  Zantac -  PO   300 mg





  HS [facility]   Administration





     





     





     





     














<Livia Fregoso - Last Filed: 09/26/19 09:45>





Medical Decision Making





- Medical Decision Making





09/24/19 13:49





#Chest pain


- Trops ordered


- Repeat EKG


- CXR





#Abdominal pain


- CBC, CMP


- UA, Urine cx


- Lipase


- Ultrasound RUQ: Borderline hepatomegaly w fatty infiltration vs 

hepatocellular disease, 1.4cm nonobs stone in R mid kidney, no hydronephrosis











09/24/19 16:18





- Spoke to Dr. Bui who explained that patient was sent from his office 

because she was unable to have a stress test after she was found to have 

elevated BP. Will admit and attempt stress test tomorrow





- Patient admitted under [facility], spoke to admitting physician








<Yogesh Rodas - Last Filed: 09/24/19 16:27>





*DC/Admit/Observation/Transfer





- Discharge Dispostion


Decision to Admit order: Yes





<Yogesh Rodas - Last Filed: 09/24/19 16:27>





- Discharge Dispostion


Decision to Admit order: Yes





<Livia Fregoso - Last Filed: 09/26/19 09:45>


Diagnosis at time of Disposition: 


 Atypical chest pain








- Discharge Dispostion


Condition at time of disposition: Good





Discharge





<Yogesh Rodas - Last Filed: 09/24/19 16:27>





- Discharge Information


Problems reviewed: Yes





<Livia Fregoso - Last Filed: 09/26/19 09:45>





- Discharge Information


Clinical Impression/Diagnosis: 


 Atypical chest pain





Condition: Improved


Disposition: HOME

## 2019-09-25 VITALS — SYSTOLIC BLOOD PRESSURE: 140 MMHG | HEART RATE: 84 BPM | DIASTOLIC BLOOD PRESSURE: 104 MMHG | TEMPERATURE: 98.1 F

## 2019-09-25 LAB
ALBUMIN SERPL-MCNC: 3.9 G/DL (ref 3.4–5)
ALP SERPL-CCNC: 79 U/L (ref 45–117)
ALT SERPL-CCNC: 40 U/L (ref 13–61)
ANION GAP SERPL CALC-SCNC: 7 MMOL/L (ref 8–16)
AST SERPL-CCNC: 33 U/L (ref 15–37)
BASOPHILS # BLD: 0.6 % (ref 0–2)
BILIRUB SERPL-MCNC: 0.2 MG/DL (ref 0.2–1)
BUN SERPL-MCNC: 10 MG/DL (ref 7–18)
CALCIUM SERPL-MCNC: 9.6 MG/DL (ref 8.5–10.1)
CHLORIDE SERPL-SCNC: 106 MMOL/L (ref 98–107)
CO2 SERPL-SCNC: 28 MMOL/L (ref 21–32)
CREAT SERPL-MCNC: 0.6 MG/DL (ref 0.55–1.3)
DEPRECATED RDW RBC AUTO: 14.1 % (ref 11.6–15.6)
EOSINOPHIL # BLD: 2.6 % (ref 0–4.5)
GLUCOSE SERPL-MCNC: 110 MG/DL (ref 74–106)
HCT VFR BLD CALC: 40.1 % (ref 32.4–45.2)
HGB BLD-MCNC: 13.6 GM/DL (ref 10.7–15.3)
LYMPHOCYTES # BLD: 40.4 % (ref 8–40)
MAGNESIUM SERPL-MCNC: 2.4 MG/DL (ref 1.8–2.4)
MCH RBC QN AUTO: 31.4 PG (ref 25.7–33.7)
MCHC RBC AUTO-ENTMCNC: 33.9 G/DL (ref 32–36)
MCV RBC: 92.6 FL (ref 80–96)
MONOCYTES # BLD AUTO: 7.9 % (ref 3.8–10.2)
NEUTROPHILS # BLD: 48.5 % (ref 42.8–82.8)
PLATELET # BLD AUTO: 220 K/MM3 (ref 134–434)
PMV BLD: 8.4 FL (ref 7.5–11.1)
POTASSIUM SERPLBLD-SCNC: 3.7 MMOL/L (ref 3.5–5.1)
PROT SERPL-MCNC: 7 G/DL (ref 6.4–8.2)
RBC # BLD AUTO: 4.33 M/MM3 (ref 3.6–5.2)
SODIUM SERPL-SCNC: 141 MMOL/L (ref 136–145)
WBC # BLD AUTO: 5.2 K/MM3 (ref 4–10)

## 2019-09-25 RX ADMIN — POLYVINYL ALCOHOL SCH DROP: 14 SOLUTION/ DROPS OPHTHALMIC at 14:57

## 2019-09-25 RX ADMIN — POLYVINYL ALCOHOL SCH: 14 SOLUTION/ DROPS OPHTHALMIC at 13:29

## 2019-09-25 RX ADMIN — ASPIRIN SCH MG: 81 TABLET, COATED ORAL at 13:29

## 2019-09-25 RX ADMIN — POLYVINYL ALCOHOL SCH: 14 SOLUTION/ DROPS OPHTHALMIC at 09:56

## 2019-09-25 RX ADMIN — ASPIRIN SCH: 81 TABLET, COATED ORAL at 09:57

## 2019-09-25 NOTE — PN
Progress Note, Physician





- Current Medication List


Current Medications: 


Active Medications





Amlodipine Besylate (Norvasc -)  5 mg PO DAILY [facility]


Artificial Tears (Artificial Tears)  1 drop OU QID [facility]


   Last Admin: 09/24/19 22:36 Dose:  1 drop


Aspirin (Ecotrin -)  81 mg PO DAILY [facility]


Atorvastatin Calcium (Lipitor -)  40 mg PO HS [facility]


   Last Admin: 09/24/19 22:36 Dose:  40 mg


Duloxetine HCl (Cymbalta -)  30 mg PO DAILY [facility]


Nortriptyline HCl (Pamelor -)  10 mg PO [facility]


   Last Admin: 09/24/19 22:36 Dose:  10 mg


Paroxetine HCl (Paxil -)  20 mg PO DAILY [facility]


Pneumococcal 13-Valent Conj Vacc (Prevnar 13 Syringe -)  0.5 ml IM .ONCE ONE


   Stop: 09/25/19 10:01


Ranitidine HCl (Zantac -)  300 mg PO [facility]


   Last Admin: 09/24/19 22:36 Dose:  300 mg











- Objective


Vital Signs: 


 Vital Signs











Temperature  97.8 F   09/25/19 05:00


 


Pulse Rate  66   09/25/19 05:00


 


Respiratory Rate  18   09/25/19 05:00


 


Blood Pressure  119/79   09/25/19 05:00


 


O2 Sat by Pulse Oximetry (%)  96   09/24/19 21:00











Labs: 


 CBC, BMP





 09/25/19 06:15 





 09/25/19 06:15 











- ....Imaging


Ultrasound: Report Reviewed (Borderline hepatomegaly with fatty infiltration 

versus hepatocellular disease,  1.4 cm nonobstructing stone in the right mid 

kidney without gross evidence of hydronephrosis.)





Problem List





- Problems


(1) Prophylactic measure


Code(s): Z29.9 - ENCOUNTER FOR PROPHYLACTIC MEASURES, UNSPECIFIED   





(2) HLD (hyperlipidemia)


Code(s): E78.5 - HYPERLIPIDEMIA, UNSPECIFIED   





(3) HTN (hypertension)


Code(s): I10 - ESSENTIAL (PRIMARY) HYPERTENSION   





(4) Depression


Code(s): F32.9 - MAJOR DEPRESSIVE DISORDER, SINGLE EPISODE, UNSPECIFIED   





(5) Atypical chest pain


Code(s): R07.89 - OTHER CHEST PAIN   





(6) Chest pain


Code(s): R07.9 - CHEST PAIN, UNSPECIFIED   


Qualifiers: 


   Chest pain type: unspecified   Qualified Code(s): R07.9 - Chest pain, 

unspecified   





(7) Vertigo


Code(s): R42 - DIZZINESS AND GIDDINESS   





(8) Fibromyalgia


Code(s): M79.7 - FIBROMYALGIA   





(9) Abdominal pain


Code(s): R10.9 - UNSPECIFIED ABDOMINAL PAIN

## 2019-09-25 NOTE — DS
Physical Exam: 


SUBJECTIVE: Patient seen and examined








OBJECTIVE:





 Vital Signs











 Period  Temp  Pulse  Resp  BP Sys/Murray  Pulse Ox


 


 Last 24 Hr  97.8 F-98.1 F    18-18  119-142/  96-96








PHYSICAL EXAM





GENERAL: The patient is awake, alert, and fully oriented, in no acute distress.


HEAD: Normal with no signs of trauma.


EYES: PERRL, extraocular movements intact, sclera anicteric, conjunctiva clear. 


ENT: Ears normal, nares patent, oropharynx clear without exudates, moist mucous 

membranes.


NECK: Trachea midline, full range of motion, supple. 


LUNGS: Breath sounds equal, clear to auscultation bilaterally, no wheezes, no 

crackles, no accessory muscle use. 


HEART: Regular rate and rhythm, S1, S2 without murmur, rub or gallop.


ABDOMEN: Soft, nontender, nondistended, normoactive bowel sounds, no guarding, 

no rebound, no hepatosplenomegaly, no masses.


EXTREMITIES: 2+ pulses, warm, well-perfused, no edema. 


NEUROLOGICAL: Cranial nerves II through XII grossly intact. Normal speech, gait 

not observed.


PSYCH: Normal mood, normal affect.


SKIN: Warm, dry, normal turgor, no rashes or lesions noted.





LABS


 Laboratory Results - last 24 hr











  09/24/19 09/24/19 09/25/19





  15:20 21:00 06:15


 


WBC    5.2


 


RBC    4.33


 


Hgb    13.6


 


Hct    40.1


 


MCV    92.6


 


MCH    31.4


 


MCHC    33.9


 


RDW    14.1


 


Plt Count    220


 


MPV    8.4


 


Absolute Neuts (auto)    2.5


 


Neutrophils %    48.5


 


Lymphocytes %    40.4 H


 


Monocytes %    7.9


 


Eosinophils %    2.6


 


Basophils %    0.6


 


Nucleated RBC %    0


 


Sodium   


 


Potassium   


 


Chloride   


 


Carbon Dioxide   


 


Anion Gap   


 


BUN   


 


Creatinine   


 


Est GFR (CKD-EPI)AfAm   


 


Est GFR (CKD-EPI)NonAf   


 


Random Glucose   


 


Calcium   


 


Magnesium   


 


Total Bilirubin   


 


AST   


 


ALT   


 


Alkaline Phosphatase   


 


Creatine Kinase   95 


 


Troponin I   < 0.02 


 


Total Protein   


 


Albumin   


 


Urine Color  Yellow  


 


Urine Appearance  Clear  


 


Urine pH  6.5  


 


Ur Specific Gravity  1.010  


 


Urine Protein  Negative  


 


Urine Glucose (UA)  Negative  


 


Urine Ketones  Negative  


 


Urine Blood  Trace-intact  


 


Urine Nitrite  Negative  


 


Urine Bilirubin  Negative  


 


Urine Urobilinogen  0.2  


 


Ur Leukocyte Esterase  Negative  














  09/25/19





  06:15


 


WBC 


 


RBC 


 


Hgb 


 


Hct 


 


MCV 


 


MCH 


 


MCHC 


 


RDW 


 


Plt Count 


 


MPV 


 


Absolute Neuts (auto) 


 


Neutrophils % 


 


Lymphocytes % 


 


Monocytes % 


 


Eosinophils % 


 


Basophils % 


 


Nucleated RBC % 


 


Sodium  141


 


Potassium  3.7


 


Chloride  106


 


Carbon Dioxide  28


 


Anion Gap  7 L


 


BUN  10.0


 


Creatinine  0.6


 


Est GFR (CKD-EPI)AfAm  111.64


 


Est GFR (CKD-EPI)NonAf  96.33


 


Random Glucose  110 H


 


Calcium  9.6


 


Magnesium  2.4


 


Total Bilirubin  0.2


 


AST  33


 


ALT  40


 


Alkaline Phosphatase  79


 


Creatine Kinase 


 


Troponin I 


 


Total Protein  7.0


 


Albumin  3.9


 


Urine Color 


 


Urine Appearance 


 


Urine pH 


 


Ur Specific Gravity 


 


Urine Protein 


 


Urine Glucose (UA) 


 


Urine Ketones 


 


Urine Blood 


 


Urine Nitrite 


 


Urine Bilirubin 


 


Urine Urobilinogen 


 


Ur Leukocyte Esterase 











HOSPITAL COURSE:





Date of Admission:09/24/19





Date of Discharge: 09/25/19











Minutes to complete discharge: 35





Discharge Summary


Reason For Visit: CHEST PAIN


Current Active Problems





Abdominal pain (Acute)


Atypical chest pain (Acute)


Depression (Acute)


Fibromyalgia (Acute)


HLD (hyperlipidemia) (Acute)


HTN (hypertension) (Acute)


Prophylactic measure (Acute)








Condition: Improved





- Instructions


Diet, Activity, Other Instructions: 


You underwent a cardiac stress test and it was normal. The echocardiogram was 

also normal. You can follow with Dr Navarrete. Resume all your home 

medications and diet. Call your primary care provider and make an appointment 

in 2 weeks. If you have any further complaints of chest pain call your primary 

care provider/ Dr Gaviria or return back to the ED


Disposition: HOME





- Home Medications


Comprehensive Discharge Medication List: 


Ambulatory Orders





Amlodipine Besylate [Norvasc -] 5 mg PO DAILY 09/24/19 


Duloxetine HCl [Cymbalta] 30 mg PO DAILY 09/24/19 


Nortriptyline HCl [Pamelor -] 10 mg PO HS 09/24/19 


Paroxetine HCl [Paxil] 20 mg PO DAILY 09/24/19 


Polyvinyl Alcohol [Artificial Tears] 1 drop OU ASDIR 09/24/19 


Ranitidine [Zantac -] 300 mg PO HS 09/24/19 


Simvastatin 40 mg PO HS 09/24/19 


Aspirin Coated [Ecotrin -] 81 mg PO DAILY  tablet.ec 09/25/19 











Problem List





- Problems


(1) Prophylactic measure


Code(s): Z29.9 - ENCOUNTER FOR PROPHYLACTIC MEASURES, UNSPECIFIED   





(2) HLD (hyperlipidemia)


Code(s): E78.5 - HYPERLIPIDEMIA, UNSPECIFIED   





(3) HTN (hypertension)


Code(s): I10 - ESSENTIAL (PRIMARY) HYPERTENSION   





(4) Depression


Code(s): F32.9 - MAJOR DEPRESSIVE DISORDER, SINGLE EPISODE, UNSPECIFIED   





(5) Atypical chest pain


Code(s): R07.89 - OTHER CHEST PAIN   





(6) Chest pain


Code(s): R07.9 - CHEST PAIN, UNSPECIFIED   


Qualifiers: 


   Chest pain type: unspecified   Qualified Code(s): R07.9 - Chest pain, 

unspecified   





(7) Vertigo


Code(s): R42 - DIZZINESS AND GIDDINESS   





(8) Fibromyalgia


Code(s): M79.7 - FIBROMYALGIA   





(9) Abdominal pain


Code(s): R10.9 - UNSPECIFIED ABDOMINAL PAIN

## 2019-09-25 NOTE — ECHO
______________________________________________________________________________



Name: CONTIMELODY LARA                                      Exam:Adult Echocardiogram

MRN: [id number]         Study Date: 2019 01:54 PM

Age: 64 yrs

______________________________________________________________________________



Reason For Study: Chest pain

Height: 63 in        Weight: 210 lb        BSA: 2.0 m2



______________________________________________________________________________



MMode/2D Measurements & Calculations

IVSd: 1.3 cm                                            Ao root diam: 2.7 cm

LVIDd: 3.2 cm                                           LA dimension: 4.1 cm

LVIDs: 2.0 cm                                           ACS: 1.9 cm

LVPWd: 1.5 cm



_________________________________________________________

EDV(Teich): 39.8 ml                                     LVOT diam: 1.9 cm

ESV(Teich): 13.1 ml



_________________________________________________________

RV S Gonzalo: 10.6 cm/sec



Doppler Measurements & Calculations

MV E max gonzalo: 67.4 cm/sec

MV A max gonzalo: 89.2 cm/sec                           MV dec slope: 236.4 cm/sec2

MV E/A: 0.76



_____________________________________________________

TR max gonzalo: 266.0 cm/sec                            Med Peak E' Gonzalo: 10.9 cm/sec

TR max P.3 mmHg                                Med E/e': 6.2

                                                    Lat Peak E' Gonzalo: 10.2 cm/sec

                                                    Lat E/e': 6.6





______________________________________________________________________________

Procedure

The study was non-diagnostic in quality. No definitive statements could be made about this echo due t
o

extremely poor acoustic windows. The study was technically difficult with many images being suboptima
l in

quality.

Left Ventricle

The left ventricular size, thickness and function are normal. The left ventricular ejection fraction 
is

normal. Regional wall motion abnormalities cannot be excluded due to limited visualization.

Right Ventricle

The right ventricle is not well visualized.

Atria

The left atrium is mildly dilated. The right atrium is mildly dilated.

Mitral Valve

The mitral valve is not well visualized.

Tricuspid Valve

The tricuspid valve is not well visualized. There is no tricuspid stenosis. There is mild tricuspid

regurgitation. Right ventricular systolic pressure is normal.

Aortic Valve

The aortic valve is not well visualized.

Pulmonic Valve

The pulmonic valve is not well visualized.

Great Vessels

The aortic root is normal size.



______________________________________________________________________________



Interpretation Summary

The study was non-diagnostic in quality. No definitive statements could be made about this echo due t
o

extremely poor acoustic windows.

The study was technically difficult with many images being suboptimal in quality.

The left ventricular size, thickness and function are normal

The left ventricular ejection fraction is normal.

Regional wall motion abnormalities cannot be excluded due to limited visualization.

The left atrium is mildly dilated.

The right atrium is mildly dilated.

The mitral valve is not well visualized.

The tricuspid valve is not well visualized.

There is mild tricuspid regurgitation.

Right ventricular systolic pressure is normal.





MD Mainor Milton 2019 02:57 PM

## 2019-09-25 NOTE — PN
Progress Note (short form)





- Note


Progress Note: 


s: no cp sob palps dizzy





 Vital Signs











 Period  Temp  Pulse  Resp  BP Sys/Murray  Pulse Ox


 


 Last 24 Hr  97.8 F-98.1 F    18-18  119-142/  96-96











Constitutional: Yes: No Distress, Calm


Eyes: Yes: Conjunctiva Clear, EOM Intact


HENT: Yes: Atraumatic, Normocephalic


Neck: Yes: Trachea Midline


Respiratory: Yes: CTA Bilaterally


Gastrointestinal: Yes: Soft, Abdomen, Obese


Cardiovascular: Yes: Regular Rate and Rhythm


JVD: No


PMI: Non-Displaced


Heart Sounds: Yes: S1, S2 (rrr, no m/r/g)


Edema: No


Neurological: Yes: Alert, Oriented





 Current Medications











Generic Name Dose Route Start Last Admin





  Trade Name Freq  PRN Reason Stop Dose Admin


 


Amlodipine Besylate  5 mg  09/25/19 10:00  09/25/19 09:26





  Norvasc -  PO   5 mg





  DAILY BROOKLYN   Administration





     





     





     





     


 


Artificial Tears  1 drop  09/24/19 18:00  09/25/19 14:57





  Artificial Tears  OU   1 drop





  QID BROOKLYN   Administration





     





     





     





     


 


Aspirin  81 mg  09/25/19 10:00  09/25/19 13:29





  Ecotrin -  PO   81 mg





  DAILY BROOKLYN   Administration





     





     





     





     


 


Atorvastatin Calcium  40 mg  09/24/19 22:00  09/24/19 22:36





  Lipitor -  PO   40 mg





  HS BROOKLYN   Administration





     





     





     





     


 


Duloxetine HCl  30 mg  09/25/19 10:00  09/25/19 13:29





  Cymbalta -  PO   30 mg





  DAILY BROOKLYN   Administration





     





     





     





     


 


Nortriptyline HCl  10 mg  09/24/19 22:00  09/24/19 22:36





  Pamelor -  PO   10 mg





  HS BROOK[facility]N   Administration





     





     





     





     


 


Paroxetine HCl  20 mg  09/25/19 10:00  09/25/19 13:29





  Paxil -  PO   20 mg





  DAILY BROOKLYN   Administration





     





     





     





     


 


Ranitidine HCl  300 mg  09/24/19 22:00  09/24/19 22:36





  Zantac -  PO   300 mg





  HS BROOK[facility]N   Administration





     





     





     





     








 CBC, BMP





 09/25/19 06:15 





 09/25/19 06:15 











Imaging





- Results


Chest X-ray: Image Reviewed (no acute path)


Ultrasound: Report Reviewed (renal stone, non-obstx)


EKG: Image Reviewed (NSR, LAE, NSST)





Assessment/Plan





IMP:


HTN, chronic


Chest pain syndrome, chronic: suspect MSK


HLD


GERD


Renal stone, non-obstx/ incidental








REC:


1. Echo TDS but unremarkable here


2. MIBI wnl


3. BP improved on current meds





cardiac wise stable for dc